# Patient Record
(demographics unavailable — no encounter records)

---

## 2025-01-14 NOTE — IMAGING
[de-identified] : The patient is a well appearing 48 year year old female of their stated age.  Patient ambulates with a normal gait.  Negative straight leg raise bilateral    Effected Knee:                         	  ROM:  0-120 degrees    Lachman: Negative  Pivot Shift: Negative  Anterior Drawer: Negative  Posterior Drawer / Sag: Negative  Varus Stress 0 degrees: Stable  Varus Stress 30 degrees: Stable  Valgus Stress 0 degrees: Stable  Valgus Stress 30 degrees: Stable  Medial Ladonna: Negative  Lateral Ladonna: Positive  Patella Glide: 2+  Patella Apprehension: Negative  Patella Grind: Negative    Palpation:  Medial Joint Line: Nontender  Lateral Joint Line: TTP  Medial Collateral Ligament: Nontender  Lateral Collateral Ligament/PLC: Nontender  Distal Femur: Nontender  Proximal Tibia: Nontender  Tibial Tubercle: Nontender  Distal Pole Patella: Nontender  Quadriceps Tendon: Nontender &  Intact  Patella Tendon: Nontender &  Intact  Medial Distal Hamstring/PES: Nontender  Lateral Distal Hamstring: Nontender & Stable  Iliotibial Band: Nontender  Medial Patellofemoral Ligament: Nontender  Adductor: Nontender  Proximal GSC-Plantaris: Nontender  Calf: Supple & Nontender    Inspection:  Deformity: No  Erythema: No  Ecchymosis: No  Abrasions: No  Effusion: Moderate  Prepatellar Bursitis: No    Neurologic Exam:  Sensation L4-S1: Grossly Intact    Motor Exam:  Quadriceps: 5 out of 5  Hamstrings: 5 out of 5  EHL: 5 out of 5  FHL: 5 out of 5  TA: 5 out of 5  GS: 5 out of 5    Circulatory/Pulses:  Dorsalis Pedis: 2+  Posterior Tibialis: 2+    Additional Pertinent Findings: Posterior knee pain with hyperflexion.      Contralateral Knee:                           	  ROM: 0-145 degrees    Other Pertinent Findings: None

## 2025-01-14 NOTE — DATA REVIEWED
[FreeTextEntry1] :  Left X-Ray Examination of the KNEE (4 views): there are no fractures, subluxations or dislocations.     MR of left knee: large chondral defect in MFC. Meniscus intact, ligaments intact, quad and patellar tendon intact. Large effusion. No loose bodies.

## 2025-01-14 NOTE — HISTORY OF PRESENT ILLNESS
[de-identified] : Date of Injury/Onset: Patient reports left knee pain for the past couple weeks. Patient notes she has a knot/bump on her left knee but no prior injury.  Pain: At Rest: 5 With Activity:8 Mechanism of injury: This is NOT a Work Related Injury being treated under Worker's Compensation. This is NOT an athletic injury occurring associated with an interscholastic or organized sports team. Quality of symptoms: Aching Improves with: Massage temporarily helped Worse with: Standing for extended periods of time and bending the leg back Prior treatment: No Prior Imaging: No Reports Available For Review Today: Out of work/sport: No School/Sport/Position/Occupation:  Personal goal: Additional Information: Patient seen and examined.  Patient presents today for evaluation of 3-week history of left knee pain.  She denies any specific trauma or injury.  Denies any interval trauma or injury.  She denies any prior change in hobbies.  However she does state that she is up on her feet a lot and may have twisted her knee awkwardly.  She does endorse posterior lateral knee pain as well as an effusion.  She states the pain is worsened with knee flexion and changing positions while sleeping.  She has not been seen or evaluated by any other doctors had a formal treatment, therapy injections or surgery.  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  She has a negative past medical history for diabetes hypothyroidism or any rheumatologic disorders.  She has not been seen or evaluated by other doctors having formal treatment, therapy injections or surgery.  2/6/2024: Patient is here today to follow up on left knee MRI results. Patient has been taking the meloxicam and some improvement in stiffness.  03/19/2024: Patient is here for left knee PRP injection. Patient started PT but hasn't been doing it consistently. Patient notes mild improvement in knee since last visit but still experiences some stiffness but no swelling.  1/14/25 Patient is here for Left knee follow up. States she was dancing on [place] and her knee began to swell and become stiff and painful. She did PT back in July 2024, with some improvement.

## 2025-01-14 NOTE — DISCUSSION/SUMMARY
[de-identified] :  Due to the patients mechanical symptoms along with lateral joint line pain, effusion, and pos allie test on exam we will get an mri to eval for lateral meniscus tear    - The patient was advised of the diagnosis.  The natural history of the pathology was explained to the patient in layman's terms.  Several different treatment options were discussed and explained including the risks and benefits of both surgical and non-surgical treatments.  - The patient was advised to apply ice (wrapped in a towel or protective covering) to the area daily (20 minutes at a time, 2-4X/day).  - The patient was advised to modify their activities.  - f/u after mri  Concern is for posterior root tear.: Plan for next visit: MRI review, discuss findings and risks and benefits of operative nonoperative treatment. ****  The natural progression of osteoarthritis was explained to the patient.  We discussed the possible treatment options from conservative to operative.  These included NSAIDS, Glucosamine and Chondrotin sulfate, and Physical Therapy as well different types of injections.  We also discussed that at some point they may progress to needed a TKA.  Information and pamphlets were given.    We discussed their diagnosis and treatment options at length including surgical and non-surgical options. We will first attempt conservative treatment with activity modification, PT, icing, weight loss, and anti-inflammatory medications. We discussed the possible of injections (steroid and viscosupplementation) in the future. The patient was provided with a PT prescription to work on ROM, hip ER/abductors strengthening, quad/hamstring stretches and strengthening, and other exercises on the Knee Arthritis Protocol.    Dx / Natural History:  The patient was advised of the diagnosis.  The natural history of the pathology was explained in full to the patient in layman's terms.  Several different treatment options were discussed and explained in full to the patient including the risks and benefits of both surgical and non-surgical treatments.  All questions and concerns were answered.     Pain Guide Activities:  The patient was advised to let pain guide the gradual advancement of activities.    Physical Therapy:  The patient was provided with a prescription for Physical Therapy.    Icing:  The patient was advised to apply ice (wrapped in a towel or protective covering) to the area daily (20 minutes at a time, 2-4X/day).    Follow up in 6 weeks to re-evaluate.  - the risks, benefits, and alternatives to platelet rich plasma injection along with a discussion regarding its possible efficacy were reviewed with the patient.  risks outlined include but are not limited to bruising, bleeding, temporary increase in pain, infection, syncopal episode, failure to resolve Symptoms and Symptoms recurrence.  patient understood the risks and will consider this Treatment course.  Plan for next visit Assess medial joint line pain, Consider PRP injection v HA v CSI , [employer] modification *** Patient seen and examined.  Patient presents today following up for left knee pain.  Patient has a large chondral defect in medial femoral condyle with associated pain and swelling.  We discussed at last visit risk and benefits of different treatment modalities including injections and for Biologics, PRP, HA, CSI.  After discussing the risk and benefits of PRP patient elected to undergo that today.  The risks, benefits, and alternatives to platelet rich plasma injection along with a discussion regarding its possible efficacy were reviewed with the patient.  risks outlined include but are not limited to bruising, bleeding, temporary increase in pain, infection, syncopal episode, failure to resolve Symptoms and Symptoms recurrence.  patient understood the risks and asked to proceed with this Treatment course.  Discussed with patient that she should continue the compression wrap for the next few days.  She should ice and take Tylenol as needed for pain and avoid any nonsteroidal anti-inflammatories.  She should then initiate physical therapy continue to work on kinetic chain strengthening.  I will plan to see her back in approximately 6 weeks time.  Should her symptoms fail to improve or worsen we can discuss further treatment options. *** 1/14 Pt has multiple knee issues, she has chondral defects in medial and lateral compartments with associated meniscal tears d/w pt that at this juncture I would recommend Mobic and PT to start and have partient assess if pain is more mechanical v not.  If mechanical would recommend PLM PMM, if not mechanical would recommend CSI v HA injections Pt understands the plan RTC 6 weeks  next visit: assess for mechanical symptoms if positive pmm plm

## 2025-02-25 NOTE — DISCUSSION/SUMMARY
[de-identified] :  Due to the patients mechanical symptoms along with lateral joint line pain, effusion, and pos allie test on exam we will get an mri to eval for lateral meniscus tear    - The patient was advised of the diagnosis.  The natural history of the pathology was explained to the patient in layman's terms.  Several different treatment options were discussed and explained including the risks and benefits of both surgical and non-surgical treatments.  - The patient was advised to apply ice (wrapped in a towel or protective covering) to the area daily (20 minutes at a time, 2-4X/day).  - The patient was advised to modify their activities.  - f/u after mri  Concern is for posterior root tear.: Plan for next visit: MRI review, discuss findings and risks and benefits of operative nonoperative treatment. ****  The natural progression of osteoarthritis was explained to the patient.  We discussed the possible treatment options from conservative to operative.  These included NSAIDS, Glucosamine and Chondrotin sulfate, and Physical Therapy as well different types of injections.  We also discussed that at some point they may progress to needed a TKA.  Information and pamphlets were given.    We discussed their diagnosis and treatment options at length including surgical and non-surgical options. We will first attempt conservative treatment with activity modification, PT, icing, weight loss, and anti-inflammatory medications. We discussed the possible of injections (steroid and viscosupplementation) in the future. The patient was provided with a PT prescription to work on ROM, hip ER/abductors strengthening, quad/hamstring stretches and strengthening, and other exercises on the Knee Arthritis Protocol.    Dx / Natural History:  The patient was advised of the diagnosis.  The natural history of the pathology was explained in full to the patient in layman's terms.  Several different treatment options were discussed and explained in full to the patient including the risks and benefits of both surgical and non-surgical treatments.  All questions and concerns were answered.     Pain Guide Activities:  The patient was advised to let pain guide the gradual advancement of activities.    Physical Therapy:  The patient was provided with a prescription for Physical Therapy.    Icing:  The patient was advised to apply ice (wrapped in a towel or protective covering) to the area daily (20 minutes at a time, 2-4X/day).    Follow up in 6 weeks to re-evaluate.  - the risks, benefits, and alternatives to platelet rich plasma injection along with a discussion regarding its possible efficacy were reviewed with the patient.  risks outlined include but are not limited to bruising, bleeding, temporary increase in pain, infection, syncopal episode, failure to resolve Symptoms and Symptoms recurrence.  patient understood the risks and will consider this Treatment course.  Plan for next visit Assess medial joint line pain, Consider PRP injection v HA v CSI , [employer] modification *** Patient seen and examined.  Patient presents today following up for left knee pain.  Patient has a large chondral defect in medial femoral condyle with associated pain and swelling.  We discussed at last visit risk and benefits of different treatment modalities including injections and for Biologics, PRP, HA, CSI.  After discussing the risk and benefits of PRP patient elected to undergo that today.  The risks, benefits, and alternatives to platelet rich plasma injection along with a discussion regarding its possible efficacy were reviewed with the patient.  risks outlined include but are not limited to bruising, bleeding, temporary increase in pain, infection, syncopal episode, failure to resolve Symptoms and Symptoms recurrence.  patient understood the risks and asked to proceed with this Treatment course.  Discussed with patient that she should continue the compression wrap for the next few days.  She should ice and take Tylenol as needed for pain and avoid any nonsteroidal anti-inflammatories.  She should then initiate physical therapy continue to work on kinetic chain strengthening.  I will plan to see her back in approximately 6 weeks time.  Should her symptoms fail to improve or worsen we can discuss further treatment options. *** 1/14 Pt has multiple knee issues, she has chondral defects in medial and lateral compartments with associated meniscal tears d/w pt that at this juncture I would recommend Mobic and PT to start and have partient assess if pain is more mechanical v not.  If mechanical would recommend PLM PMM, if not mechanical would recommend CSI v HA injections Pt understands the plan RTC 6 weeks  next visit: assess for mechanical symptoms if positive pmm plm *** 2.25 Pt has multiple knee issues, she has chondral defects in medial and lateral compartments with associated meniscal tears Pt states symptoms are improving somewhat with PT but no mechanical symptoms today Rec: continue PT, shoeware modification, euflexxa injections ordered  If mechanical would recommend PLM PMM, if not mechanical would recommend CSI v HA injections Pt understands the plan RTC after HA inj auth  next visit: series 1 HA assess for mechanical symptoms if positive pmm plm

## 2025-02-25 NOTE — IMAGING
[de-identified] : LEFT KNEE  Inspection:  mild effusion  Palpation: lateral joint line tenderness, anterior tenderness  Knee Range of Motion:  3-125   Strength: 5/5 Quadriceps strength, 5/5 Hamstring strength  Neurological: light touch is intact throughout  Ligament Stability and Special Tests:   McMurrays: neg  Lachman: neg  Pivot Shift: neg  Posterior Drawer: neg  Valgus: neg  Varus: neg  Patella Apprehension: neg  Patella Maltracking: neg

## 2025-02-25 NOTE — HISTORY OF PRESENT ILLNESS
[de-identified] : Date of Injury/Onset: Patient reports left knee pain for the past couple weeks. Patient notes she has a knot/bump on her left knee but no prior injury.  Pain: At Rest: 5 With Activity:8 Mechanism of injury: This is NOT a Work Related Injury being treated under Worker's Compensation. This is NOT an athletic injury occurring associated with an interscholastic or organized sports team. Quality of symptoms: Aching Improves with: Massage temporarily helped Worse with: Standing for extended periods of time and bending the leg back Prior treatment: No Prior Imaging: No Reports Available For Review Today: Out of work/sport: No School/Sport/Position/Occupation:  Personal goal: Additional Information: Patient seen and examined.  Patient presents today for evaluation of 3-week history of left knee pain.  She denies any specific trauma or injury.  Denies any interval trauma or injury.  She denies any prior change in hobbies.  However she does state that she is up on her feet a lot and may have twisted her knee awkwardly.  She does endorse posterior lateral knee pain as well as an effusion.  She states the pain is worsened with knee flexion and changing positions while sleeping.  She has not been seen or evaluated by any other doctors had a formal treatment, therapy injections or surgery.  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  She has a negative past medical history for diabetes hypothyroidism or any rheumatologic disorders.  She has not been seen or evaluated by other doctors having formal treatment, therapy injections or surgery.  2/6/2024: Patient is here today to follow up on left knee MRI results. Patient has been taking the meloxicam and some improvement in stiffness.  03/19/2024: Patient is here for left knee PRP injection. Patient started PT but hasn't been doing it consistently. Patient notes mild improvement in knee since last visit but still experiences some stiffness but no swelling.  1/14/25 Patient is here for Left knee follow up. States she was dancing on [place] and her knee began to swell and become stiff and painful. She did PT back in July 2024, with some improvement.   02/25/2025: Patient is here today to follow up on left knee. Patient had been doing PT, completed meloxicam.